# Patient Record
(demographics unavailable — no encounter records)

---

## 2025-03-17 NOTE — ASSESSMENT
[FreeTextEntry1] : CAD (coronary artery disease) (414.00) (I25.10)\par  Chest discomfort (786.59) (R07.89)\par  Dyspnea on exertion (786.09) (R06.00)\par  Hyperlipidemia (272.4) (E78.5)\par  Hypertension (401.9) (I10)

## 2025-03-17 NOTE — DISCUSSION/SUMMARY
[FreeTextEntry1] : 1. Asymptomatic. Needs cataract - can proceed remaining on all meds. 2. Aggressive med Rx 3. Follow up 2-4 months.  [EKG obtained to assist in diagnosis and management of assessed problem(s)] : EKG obtained to assist in diagnosis and management of assessed problem(s)

## 2025-03-17 NOTE — HISTORY OF PRESENT ILLNESS
[FreeTextEntry1] : Ms. Paez is a 61 year-old woman with known poorly controlled DM, HTN, HLD and CAD who underwent PCI to the LAD with atherectomy after iFR was siginficant. There is residual moderate distal LM-ostial LAD disease, iFR negative. She has been feeling better since the PCI however her sugars have been poorly controlled (fasting of >190) and her headache has become worse since the start of imdur - started ranexa instead.   Since her last visit she reports some recurrence of stable exertional angina (CCS II). Her BP was suboptimal at 142/92mmHg and she is not exercising consistently. As symptoms are stable currently we discussed continued medical therapy with a focus on consistent exercise and achieving optimal BP control. Her ranolazine dose can also be increased if need be.   Follow-up -  notes some exertional left arm/shoulder pain. Compliant with cardiac meds.

## 2025-05-30 NOTE — PHYSICAL EXAM
[MA] : MA [Appropriately responsive] : appropriately responsive [Alert] : alert [No Acute Distress] : no acute distress [No Lymphadenopathy] : no lymphadenopathy [Regular Rate Rhythm] : regular rate rhythm [No Murmurs] : no murmurs [Clear to Auscultation B/L] : clear to auscultation bilaterally [Soft] : soft [Non-tender] : non-tender [Non-distended] : non-distended [No HSM] : No HSM [No Lesions] : no lesions [No Mass] : no mass [Oriented x3] : oriented x3 [Examination Of The Breasts] : a normal appearance [No Masses] : no breast masses were palpable [Labia Majora] : normal [Labia Minora] : normal [Normal] : normal [Enlarged ___ wks] : enlarged [unfilled] ~Uweeks [Uterine Adnexae] : normal [FreeTextEntry2] : arlet [FreeTextEntry6] : ?? slightly bulky uterus; limited by habitus, sending for TV sono

## 2025-05-30 NOTE — PLAN
[FreeTextEntry1] : annual: pap and hpv note has not seen gyn in several yrs  some UTI sx:  u cx also occ pelvic pressure/ also possible fibroids: TV sono ordered already has mammo referral hx of multiple miscarriages in past, not s/a now, 2 csections on insulin in pregnancy refer to urology for some stress incontinence

## 2025-05-30 NOTE — HISTORY OF PRESENT ILLNESS
[Y] : Positive pregnancy history [Previously active] : previously active [Men] : men [FreeTextEntry1] : Christen Paez presents for well woman visit with gyn exam.  [PGxTotal] : 6 [[facility] : 2 [PGHxABSpont] : 4

## 2025-07-02 NOTE — DISCUSSION/SUMMARY
[FreeTextEntry1] : 1. Asymptomatic. Needs cataract - can proceed remaining on all meds. 2. Aggressive med Rx 3. Follow up 2-4 months.